# Patient Record
Sex: MALE | Race: WHITE | NOT HISPANIC OR LATINO | URBAN - METROPOLITAN AREA
[De-identification: names, ages, dates, MRNs, and addresses within clinical notes are randomized per-mention and may not be internally consistent; named-entity substitution may affect disease eponyms.]

---

## 2020-08-06 ENCOUNTER — OFFICE VISIT (OUTPATIENT)
Dept: URBAN - METROPOLITAN AREA CLINIC 78 | Facility: CLINIC | Age: 80
End: 2020-08-06
Payer: MEDICARE

## 2020-08-06 ENCOUNTER — DASHBOARD ENCOUNTERS (OUTPATIENT)
Age: 80
End: 2020-08-06

## 2020-08-06 DIAGNOSIS — K58.9 FUNCTIONAL BOWEL DISEASE: ICD-10-CM

## 2020-08-06 DIAGNOSIS — Z79.01 ANTICOAGULANT LONG-TERM USE: ICD-10-CM

## 2020-08-06 PROCEDURE — 99214 OFFICE O/P EST MOD 30 MIN: CPT | Performed by: INTERNAL MEDICINE

## 2020-08-06 RX ORDER — CHOLESTYRAMINE 4 G/9G
DISSOLVE AS DIRECTED 1 PACKET AND TAKE BY ORAL ROUTE 2 TIMES A DAY FOR 90 DAYS POWDER, FOR SUSPENSION ORAL TWICE A DAY
Qty: 180 PACKET | Refills: 0 | OUTPATIENT
Start: 2020-08-06

## 2020-08-06 RX ORDER — DIPHENHYDRAMINE HCL 2 %
CREAM (GRAM) TOPICAL
Qty: 0 | Refills: 0 | Status: ON HOLD | COMMUNITY
Start: 1900-01-01

## 2020-08-06 NOTE — HPI-TODAY'S VISIT:
Personal hx of enlarged prostate s/p TURP  Patient is here for  functional diarrhea  Mild form over 20 years  Now it is increasing with freq and urgency 2 years  Denies blood in stool  Denies pain  Denies weight loss  Denies new complaints OTC zyrtec  Infreq NSAID use ... for back pain and shoulder pain  Lot  of  gas   Last colonoscopy was by DR Juárez in 2013  Patient wants colonoscopy as last resort since his symptoms are 20 year duration and he is not alarmed

## 2020-08-06 NOTE — PHYSICAL EXAM HENT:
Head,  normocephalic,  atraumatic,  Face,  Face within normal limits,  Ears,  External ears within normal limits,  Nose/Nasopharynx,  External nose  normal appearance,

## 2021-09-01 ENCOUNTER — TELEPHONE ENCOUNTER (OUTPATIENT)
Dept: URBAN - METROPOLITAN AREA CLINIC 78 | Facility: CLINIC | Age: 81
End: 2021-09-01

## 2021-09-01 RX ORDER — CHOLESTYRAMINE 4 G/9G
DISSOLVE AS DIRECTED 1 PACKET AND TAKE BY ORAL ROUTE 2 TIMES A DAY FOR 90 DAYS POWDER, FOR SUSPENSION ORAL TWICE A DAY
Qty: 180 PACKET | Refills: 3
Start: 2020-08-06

## 2021-12-09 ENCOUNTER — TELEPHONE ENCOUNTER (OUTPATIENT)
Dept: URBAN - METROPOLITAN AREA CLINIC 78 | Facility: CLINIC | Age: 81
End: 2021-12-09

## 2021-12-09 RX ORDER — CHOLESTYRAMINE 4 G/9G
DISSOLVE AS DIRECTED 1 PACKET AND TAKE BY ORAL ROUTE 2 TIMES A DAY FOR 90 DAYS POWDER, FOR SUSPENSION ORAL TWICE A DAY
Qty: 180 PACKET | Refills: 3
Start: 2020-08-06

## 2021-12-13 ENCOUNTER — TELEPHONE ENCOUNTER (OUTPATIENT)
Dept: URBAN - METROPOLITAN AREA CLINIC 78 | Facility: CLINIC | Age: 81
End: 2021-12-13

## 2021-12-13 RX ORDER — CHOLESTYRAMINE 4 G/9G
1 PACKET MIXED WITH WATER OR NON-CARBONATED DRINK POWDER, FOR SUSPENSION ORAL TWICE A DAY
Qty: 90 | Refills: 3 | OUTPATIENT
Start: 2021-12-13

## 2022-04-28 ENCOUNTER — WEB ENCOUNTER (OUTPATIENT)
Dept: URBAN - METROPOLITAN AREA CLINIC 23 | Facility: CLINIC | Age: 82
End: 2022-04-28

## 2022-05-03 ENCOUNTER — OFFICE VISIT (OUTPATIENT)
Dept: URBAN - METROPOLITAN AREA CLINIC 23 | Facility: CLINIC | Age: 82
End: 2022-05-03
Payer: MEDICARE

## 2022-05-03 DIAGNOSIS — R19.7 DIARRHEA: ICD-10-CM

## 2022-05-03 DIAGNOSIS — K58.9 IBS (IRRITABLE BOWEL SYNDROME): ICD-10-CM

## 2022-05-03 PROCEDURE — 99213 OFFICE O/P EST LOW 20 MIN: CPT | Performed by: INTERNAL MEDICINE

## 2022-05-03 RX ORDER — DIPHENHYDRAMINE HCL 2 %
CREAM (GRAM) TOPICAL
Qty: 0 | Refills: 0 | Status: ON HOLD | COMMUNITY
Start: 1900-01-01

## 2022-05-03 RX ORDER — CHOLESTYRAMINE 4 G/5G
1 SCOOP POWDER, FOR SUSPENSION ORAL ONCE A DAY
Qty: 90 | Refills: 2 | OUTPATIENT
Start: 2022-05-03

## 2022-05-03 RX ORDER — CHOLESTYRAMINE 4 G/9G
DISSOLVE AS DIRECTED 1 PACKET AND TAKE BY ORAL ROUTE 2 TIMES A DAY FOR 90 DAYS POWDER, FOR SUSPENSION ORAL TWICE A DAY
Qty: 180 PACKET | Refills: 3 | Status: ACTIVE | COMMUNITY
Start: 2020-08-06

## 2022-05-03 RX ORDER — CHOLESTYRAMINE 4 G/9G
1 PACKET MIXED WITH WATER OR NON-CARBONATED DRINK POWDER, FOR SUSPENSION ORAL TWICE A DAY
Qty: 90 | Refills: 3 | Status: ACTIVE | COMMUNITY
Start: 2021-12-13

## 2022-05-03 NOTE — HPI-TODAY'S VISIT:
81-year-old male with history of chronic diarrhea history of irritable bowel syndrome diarrhea dominant presented today to refill his medication.  He has a diarrhea for more than 20 years he report after he eats he goes to the bathroom 2-3 times a day he reports Imodium helped him he take Questran prescribed by Dr. Pepper which helped him as well.  He does not want to proceed with colonoscopy because of risk he had the symptoms for more than 20 years no blood in the stool.  He is on Xarelto for A. fib.  No family history of colon cancer his last colonoscopy was 2013 no weight loss no blood in the stool He has 3-4 bowel movement a day mostly after he eats sometimes sudden urge and had difficulty controlling the bowel when it happened

## 2022-05-04 PROBLEM — 10743008 IRRITABLE BOWEL SYNDROME: Status: ACTIVE | Noted: 2022-05-03

## 2024-07-03 ENCOUNTER — OFFICE VISIT (OUTPATIENT)
Dept: URBAN - METROPOLITAN AREA CLINIC 23 | Facility: CLINIC | Age: 84
End: 2024-07-03

## 2024-07-31 ENCOUNTER — OFFICE VISIT (OUTPATIENT)
Dept: URBAN - METROPOLITAN AREA CLINIC 23 | Facility: CLINIC | Age: 84
End: 2024-07-31

## 2024-07-31 VITALS
TEMPERATURE: 97.5 F | HEART RATE: 63 BPM | DIASTOLIC BLOOD PRESSURE: 68 MMHG | HEIGHT: 70 IN | WEIGHT: 191 LBS | SYSTOLIC BLOOD PRESSURE: 121 MMHG | BODY MASS INDEX: 27.35 KG/M2

## 2024-07-31 NOTE — HPI-TODAY'S VISIT:
For the last couple of months feels like his diarrhea has worsened. Takes half a loperamide and then doesn't have a bowel movement for 24-48 hours.  [employer] sausage biscuit is the only known trigger. No changes with holding of diary.  Range from loose bowels to watery stools. Total of 3-4 in 24 hours, including at night. New onset of cramping and bloating. Resolves with BM. Tried Gas-X didn't help. Pepto Bismol helps with cramping. Takes pysillum.  Good appetite. Recent weight loss from compression fracture, hospitalized to have rivaroxaban held and transitioned to heparin gtt. 10lbs in the week.   81-year-old male with history of chronic diarrhea history of irritable bowel syndrome diarrhea dominant presented today to refill his medication.  He has a diarrhea for more than 20 years he report after he eats he goes to the bathroom 2-3 times a day he reports Imodium helped him he take Questran prescribed by Dr. Pepper which helped him as well.  He does not want to proceed with colonoscopy because of risk he had the symptoms for more than 20 years no blood in the stool.  He is on Xarelto for A. fib.  No family history of colon cancer his last colonoscopy was 2013 no weight loss no blood in the stool He has 3-4 bowel movement a day mostly after he eats sometimes sudden urge and had difficulty controlling the bowel when it happened